# Patient Record
Sex: FEMALE | Race: WHITE
[De-identification: names, ages, dates, MRNs, and addresses within clinical notes are randomized per-mention and may not be internally consistent; named-entity substitution may affect disease eponyms.]

---

## 2017-02-07 ENCOUNTER — HOSPITAL ENCOUNTER (EMERGENCY)
Dept: HOSPITAL 45 - C.EDB | Age: 52
Discharge: HOME | End: 2017-02-07
Payer: COMMERCIAL

## 2017-02-07 VITALS
BODY MASS INDEX: 41.81 KG/M2 | HEIGHT: 65.98 IN | BODY MASS INDEX: 41.81 KG/M2 | WEIGHT: 260.15 LBS | WEIGHT: 260.15 LBS | HEIGHT: 65.98 IN

## 2017-02-07 VITALS — OXYGEN SATURATION: 96 % | HEART RATE: 78 BPM | DIASTOLIC BLOOD PRESSURE: 86 MMHG | SYSTOLIC BLOOD PRESSURE: 154 MMHG

## 2017-02-07 VITALS — TEMPERATURE: 98.6 F

## 2017-02-07 DIAGNOSIS — I10: ICD-10-CM

## 2017-02-07 DIAGNOSIS — Z83.3: ICD-10-CM

## 2017-02-07 DIAGNOSIS — H53.121: Primary | ICD-10-CM

## 2017-02-07 DIAGNOSIS — Z80.9: ICD-10-CM

## 2017-02-07 DIAGNOSIS — Z82.49: ICD-10-CM

## 2017-02-07 LAB
ALP SERPL-CCNC: 76 U/L (ref 45–117)
ALT SERPL-CCNC: 25 U/L (ref 12–78)
ANION GAP SERPL CALC-SCNC: 10 MMOL/L (ref 3–11)
AST SERPL-CCNC: (no result) U/L (ref 15–37)
BASOPHILS # BLD: 0.06 K/UL (ref 0–0.2)
BASOPHILS NFR BLD: 0.5 %
BUN SERPL-MCNC: 15 MG/DL (ref 7–18)
BUN/CREAT SERPL: 17.3 (ref 10–20)
CALCIUM SERPL-MCNC: 9.7 MG/DL (ref 8.5–10.1)
CHLORIDE SERPL-SCNC: 101 MMOL/L (ref 98–107)
CKMB/CK RATIO: (no result) (ref 0–3)
CO2 SERPL-SCNC: 28 MMOL/L (ref 21–32)
COMPLETE: YES
CREAT CL PREDICTED SERPL C-G-VRATE: 100 ML/MIN
CREAT SERPL-MCNC: 0.85 MG/DL (ref 0.6–1.2)
EOSINOPHIL NFR BLD AUTO: 315 K/UL (ref 130–400)
GLUCOSE SERPL-MCNC: 84 MG/DL (ref 70–99)
HCT VFR BLD CALC: 46.4 % (ref 37–47)
IG%: 0.2 %
IMM GRANULOCYTES NFR BLD AUTO: 31.7 %
INR PPP: 1 (ref 0.9–1.1)
LYMPHOCYTES # BLD: 3.96 K/UL (ref 1.2–3.4)
MAGNESIUM SERPL-MCNC: (no result) MG/DL (ref 1.8–2.4)
MCH RBC QN AUTO: 31.5 PG (ref 25–34)
MCHC RBC AUTO-ENTMCNC: 34.1 G/DL (ref 32–36)
MCV RBC AUTO: 92.4 FL (ref 80–100)
MONOCYTES NFR BLD: 4.2 %
NEUTROPHILS # BLD AUTO: 1.4 %
NEUTROPHILS NFR BLD AUTO: 62 %
PARTIAL THROMBOPLASTIN RATIO: 1.1
PMV BLD AUTO: 10.1 FL (ref 7.4–10.4)
POTASSIUM SERPL-SCNC: (no result) MMOL/L (ref 3.5–5.1)
PROTHROMBIN TIME: 10.3 SECONDS (ref 9–12)
RBC # BLD AUTO: 5.02 M/UL (ref 4.2–5.4)
SODIUM SERPL-SCNC: 139 MMOL/L (ref 136–145)
TSH SERPL-ACNC: 2.81 UIU/ML (ref 0.3–4.5)
WBC # BLD AUTO: 12.5 K/UL (ref 4.8–10.8)

## 2017-02-07 NOTE — DIAGNOSTIC IMAGING REPORT
BILATERAL CAROTID DOPPLER STUDY



HISTORY: Mental status change  atherosclerosis



COMPARISON: None.



TECHNIQUE: Real-time, grayscale, and color Doppler sonography of the carotid

arteries was performed. Imaging reviewed in the transverse and longitudinal

planes. All measurements were calculated based on NASCET criteria.



FINDINGS:

Antegrade flow is seen in the bilateral vertebral arteries. 

The brachial pressures are hemodynamically similar.

Minimal to mild plaque formation bilaterally



The peak systolic velocity within the right ICA is 77. The right systolic ratio

is 1.08.



The peak systolic velocity within the left ICA is 77. The left systolic ratio is

0.82.



IMPRESSION:  

No hemodynamically significant stenosis seen within the carotid arteries.







Electronically signed by:  Dwight Ruiz M.D.

2/7/2017 8:23 PM



Dictated Date/Time:  2/7/2017 8:22 PM

## 2017-02-07 NOTE — DIAGNOSTIC IMAGING REPORT
HEAD CT NONCONTRAST



CT DOSE: 537.48 mGy.cm



HISTORY: Mental status change  EVALUATE ALTERED MENTAL STATUS/WEAKNESS



TECHNIQUE: Multiaxial CT images of the head were performed without the use of

intravenous contrast.



Comparison: None.



Findings: The paranasal sinuses and mastoid air cells are clear. The calvarium

and skull base are intact. The ventricles and sulci are within normal limits.

There is no mass, hematoma, midline shift, or acute infarct.



Impression:

No acute intracranial abnormality. 







Electronically signed by:  Dwight Ruiz M.D.

2/7/2017 7:22 PM



Dictated Date/Time:  2/7/2017 7:22 PM

## 2017-02-07 NOTE — DIAGNOSTIC IMAGING REPORT
CHEST ONE VIEW PORTABLE



CLINICAL HISTORY: EVALUATE ALTERED MENTAL STATUS/WEAKNESS dyspnea



COMPARISON STUDY:  No previous studies for comparison.



FINDINGS: The bones soft tissues and hemidiaphragms are normal. The

cardiomediastinal silhouette is normal. The lungs are clear. The pulmonary

vasculature is normal. 



IMPRESSION:  Negative chest. 









Electronically signed by:  Dwight Ruiz M.D.

2/7/2017 6:11 PM



Dictated Date/Time:  2/7/2017 6:11 PM
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
## 2017-02-10 ENCOUNTER — HOSPITAL ENCOUNTER (OUTPATIENT)
Dept: HOSPITAL 45 - C.LABBC | Age: 52
Discharge: HOME | End: 2017-02-10
Attending: INTERNAL MEDICINE
Payer: COMMERCIAL

## 2017-02-10 DIAGNOSIS — D72.829: Primary | ICD-10-CM

## 2017-02-10 DIAGNOSIS — Z11.59: ICD-10-CM

## 2017-02-10 LAB
BASOPHILS # BLD: 0.05 K/UL (ref 0–0.2)
BASOPHILS NFR BLD: 0.4 %
COMPLETE: YES
EOSINOPHIL NFR BLD AUTO: 341 K/UL (ref 130–400)
HCT VFR BLD CALC: 46.6 % (ref 37–47)
IG%: 0.3 %
IMM GRANULOCYTES NFR BLD AUTO: 35 %
LYMPHOCYTES # BLD: 3.99 K/UL (ref 1.2–3.4)
MCH RBC QN AUTO: 31.1 PG (ref 25–34)
MCHC RBC AUTO-ENTMCNC: 33.5 G/DL (ref 32–36)
MCV RBC AUTO: 93 FL (ref 80–100)
MONOCYTES NFR BLD: 4.4 %
NEUTROPHILS # BLD AUTO: 1.4 %
NEUTROPHILS NFR BLD AUTO: 58.5 %
PMV BLD AUTO: 10.5 FL (ref 7.4–10.4)
RBC # BLD AUTO: 5.01 M/UL (ref 4.2–5.4)
WBC # BLD AUTO: 11.39 K/UL (ref 4.8–10.8)

## 2017-02-15 ENCOUNTER — HOSPITAL ENCOUNTER (OUTPATIENT)
Dept: HOSPITAL 45 - C.LABBC | Age: 52
Discharge: HOME | End: 2017-02-15
Attending: INTERNAL MEDICINE
Payer: COMMERCIAL

## 2017-02-15 DIAGNOSIS — D72.829: Primary | ICD-10-CM

## 2017-02-15 LAB
BASOPHILS # BLD: 0.05 K/UL (ref 0–0.2)
BASOPHILS NFR BLD: 0.5 %
COMPLETE: YES
EOSINOPHIL NFR BLD AUTO: 322 K/UL (ref 130–400)
HCT VFR BLD CALC: 44.5 % (ref 37–47)
IG%: 0.3 %
IMM GRANULOCYTES NFR BLD AUTO: 33 %
LYMPHOCYTES # BLD: 3.24 K/UL (ref 1.2–3.4)
MCH RBC QN AUTO: 30.9 PG (ref 25–34)
MCHC RBC AUTO-ENTMCNC: 33.9 G/DL (ref 32–36)
MCV RBC AUTO: 91.2 FL (ref 80–100)
MONOCYTES NFR BLD: 6.8 %
NEUTROPHILS # BLD AUTO: 1.7 %
NEUTROPHILS NFR BLD AUTO: 57.7 %
PMV BLD AUTO: 10.3 FL (ref 7.4–10.4)
RBC # BLD AUTO: 4.88 M/UL (ref 4.2–5.4)
WBC # BLD AUTO: 9.82 K/UL (ref 4.8–10.8)

## 2017-02-18 ENCOUNTER — HOSPITAL ENCOUNTER (OUTPATIENT)
Dept: HOSPITAL 45 - C.LABBC | Age: 52
Discharge: HOME | End: 2017-02-18
Attending: INTERNAL MEDICINE
Payer: COMMERCIAL

## 2017-02-18 DIAGNOSIS — H54.61: Primary | ICD-10-CM

## 2017-02-23 ENCOUNTER — HOSPITAL ENCOUNTER (OUTPATIENT)
Dept: HOSPITAL 45 - C.MRIBC | Age: 52
Discharge: HOME | End: 2017-02-23
Attending: INTERNAL MEDICINE
Payer: COMMERCIAL

## 2017-02-23 DIAGNOSIS — R90.89: ICD-10-CM

## 2017-02-23 DIAGNOSIS — H54.61: Primary | ICD-10-CM

## 2017-02-23 NOTE — DIAGNOSTIC IMAGING REPORT
MRA OF THE INTRACRANIAL CIRCULATION WITHOUT CONTRAST



CLINICAL HISTORY: Transient sudden right eye vision loss.    



COMPARISON STUDY: None.



TECHNIQUE: Utilizing a 1.5 Char magnet and 3-D time-of-flight technique,

unenhanced MRA of the intracranial circulation was obtained. The MRI of the

brain will be reported separately.



FINDINGS: The bilateral M1, M2, A1 and A2 segments are patent. There is no

intracranial aneurysm or abrupt cut off. The right vertebral artery is dominant.

The intracranial portion of the left vertebral artery is diminutive. There is a

fenestrated basilar artery. The posterior circulation is intact.



IMPRESSION:  Unremarkable MRA of the intracranial circulation.







Electronically signed by:  Henrik Leon M.D.

2/23/2017 8:17 AM



Dictated Date/Time:  2/23/2017 8:13 AM

## 2017-02-23 NOTE — DIAGNOSTIC IMAGING REPORT
MRI OF THE BRAIN WITHOUT AND WITH IV CONTRAST



CLINICAL HISTORY: H54.61 Vision loss of right [id number]    



COMPARISON STUDY:  No previous studies for comparison. 



TECHNIQUE:  Utilizing a 1.5 Char magnet and dedicated coil, multiplanar,

multiecho imaging of the brain was performed pre and postcontrast

administration.  IV administration of 8.5 mL of Gadavist contrast was

uneventful.



FINDINGS: Diffusion-weighted images show no acute ischemic insult.



Coronal FLAIR images demonstrate several small foci of increased signal within

the periventricular deep white matter regions within both cerebral hemispheres.

No direct involvement of the optic radiations is identified.



Sella and parasellar regions are unremarkable.



IMPRESSION:  

1. No evidence for acute ischemic insult.





2. Several small foci of increased signal within the periventricular deep white

matter regions throughout both cerebral hemispheres.

3. Differential considerations include chronic small vessel change versus a

somewhat atypical demyelinating disorder







Electronically signed by:  Dwight Ruiz M.D.

2/23/2017 8:13 AM



Dictated Date/Time:  2/23/2017 8:11 AM

## 2017-06-26 ENCOUNTER — HOSPITAL ENCOUNTER (OUTPATIENT)
Dept: HOSPITAL 45 - C.MAMM | Age: 52
Discharge: HOME | End: 2017-06-26
Attending: OBSTETRICS & GYNECOLOGY
Payer: COMMERCIAL

## 2017-06-26 DIAGNOSIS — Z12.31: Primary | ICD-10-CM

## 2017-06-26 NOTE — MAMMOGRAPHY REPORT
BILATERAL DIGITAL SCREENING MAMMOGRAM TOMOSYNTHESIS WITH CAD: 6/26/2017

CLINICAL HISTORY: Routine screening.  





TECHNIQUE:  Breast tomosynthesis in addition to standard 2D mammography was performed. Current study 
was also evaluated with a Computer Aided Detection (CAD) system.  



COMPARISON: Comparison is made to exams dated:  6/23/2016 mammogram, 6/22/2015 mammogram, 6/19/2014 m
ammogram, 6/18/2013 mammogram, 6/14/2012 mammogram, and 6/13/2011 mammogram - [facility]
enter.   



BREAST COMPOSITION:  The tissue of both breasts is almost entirely fatty.  



FINDINGS: There is a stable metallic biopsy marker in the upper outer quadrant of the left breast.  N
o suspicious mass, architectural distortion or cluster of microcalcifications is seen.  



IMPRESSION:  ACR BI-RADS CATEGORY 1: NEGATIVE

There is no mammographic evidence of malignancy. A 1 year screening mammogram is recommended.  The pa
tient will receive written notification of the results.  





Approximately 10% of breast cancers are not detected with mammography. A negative mammographic report
 should not delay biopsy if a clinically suggestive mass is present.



Barb Perkins M.D.          

ay/:6/26/2017 15:44:10  



Imaging Technologist: Keya NG(R)(M), [facility]

letter sent: Normal 1/2  

BI-RADS Code: ACR BI-RADS Category 1: Negative

## 2017-08-02 ENCOUNTER — HOSPITAL ENCOUNTER (OUTPATIENT)
Dept: HOSPITAL 45 - C.PAPS | Age: 52
Discharge: HOME | End: 2017-08-02
Attending: OBSTETRICS & GYNECOLOGY
Payer: COMMERCIAL

## 2017-08-02 DIAGNOSIS — Z01.419: Primary | ICD-10-CM

## 2018-08-15 ENCOUNTER — HOSPITAL ENCOUNTER (OUTPATIENT)
Dept: HOSPITAL 45 - C.PAPS | Age: 53
Discharge: HOME | End: 2018-08-15
Attending: OBSTETRICS & GYNECOLOGY
Payer: COMMERCIAL

## 2018-08-15 DIAGNOSIS — Z01.419: Primary | ICD-10-CM
